# Patient Record
Sex: MALE | Race: WHITE
[De-identification: names, ages, dates, MRNs, and addresses within clinical notes are randomized per-mention and may not be internally consistent; named-entity substitution may affect disease eponyms.]

---

## 2018-05-07 ENCOUNTER — HOSPITAL ENCOUNTER (EMERGENCY)
Dept: HOSPITAL 33 - ED | Age: 23
LOS: 1 days | Discharge: HOME | End: 2018-05-08
Payer: COMMERCIAL

## 2018-05-07 VITALS — HEART RATE: 64 BPM | SYSTOLIC BLOOD PRESSURE: 134 MMHG | DIASTOLIC BLOOD PRESSURE: 87 MMHG

## 2018-05-07 DIAGNOSIS — T15.12XA: Primary | ICD-10-CM

## 2018-05-07 DIAGNOSIS — S00.252A: ICD-10-CM
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
## 2018-05-07 DIAGNOSIS — H02.814: ICD-10-CM

## 2018-05-07 PROCEDURE — 99283 EMERGENCY DEPT VISIT LOW MDM: CPT

## 2018-05-07 PROCEDURE — 66999 UNLISTED PX ANT SEGMENT EYE: CPT

## 2018-05-07 PROCEDURE — 65205 REMOVE FOREIGN BODY FROM EYE: CPT

## 2018-05-07 NOTE — ERPHSYRPT
- History of Present Illness


Time Seen by Provider: 05/07/18 22:58


Source: patient


Exam Limitations: no limitations


Patient Subjective Stated Complaint: Pt arrives to Er with c/o left eye pain 

and swelling stating was plumbing around 1500 and believes either has a piece 

of marble or metal shaving in left eye. States vision is blurry.


Triage Nursing Assessment: eye is swollen with drainage.


Physician History: 





The patient is a 23-year-old male complaining that something got in his left I 

while later at work today.  He says it could be a piece of marble metal 

shaving.  He tried to irrigate it but it is still bothering him.  His past 

medical history is unremarkable.


Timing/Duration: today


Location: left eye


Severity: moderate


Apparent Injury: possibly


Associated Symptoms: redness, matting, foreign body sensation, blurred vision


Visual Assistive Devices: None


Allergies/Adverse Reactions: 








No Known Drug Allergies Allergy (Verified 05/07/18 22:51)


 





Hx Tetanus, Diphtheria Vaccination/Date Given: Yes


Hx Influenza Vaccination/Date Given: No


Hx Pneumococcal Vaccination/Date Given: No


Immunizations Up to Date: Yes





- Review of Systems


Constitutional: No Fever, No Chills


Eyes: Discharge, Tearing, Foreign Body Sensation


Ears, Nose, & Throat: No Symptoms


Respiratory: No Cough, No Dyspnea


Cardiac: No Chest Pain, No Edema, No Syncope


Abdominal/Gastrointestinal: No Abdominal Pain, No Nausea, No Vomiting, No 

Diarrhea


Genitourinary Symptoms: No Dysuria


Musculoskeletal: No Back Pain, No Neck Pain


Skin: No Rash


Neurological: No Dizziness, No Focal Weakness, No Sensory Changes


Psychological: No Symptoms


Endocrine: No Symptoms


Hematologic/Lymphatic: No Symptoms


Immunological/Allergic: No Symptoms


All Other Systems: Reviewed and Negative





- Past Medical History


Pertinent Past Medical History: Yes


Neurological History: No Pertinent History


ENT History: No Pertinent History


Cardiac History: No Pertinent History


Respiratory History: No Pertinent History


Endocrine Medical History: No Pertinent History


Musculoskeletal History: No Pertinent History


GI Medical History: No Pertinent History


 History: No Pertinent History


Psycho-Social History: No Pertinent History


Male Reproductive Disorders: No Pertinent History


Other Medical History: tubes in ears





- Past Surgical History


Past Surgical History: Yes


Neuro Surgical History: No Pertinent History


Cardiac: No Pertinent History


Respiratory: No Pertinent History


Gastrointestinal: No Pertinent History


Genitourinary: No Pertinent History


Musculoskeletal: No Pertinent History


Male Surgical History: No Pertinent History


Other Surgical History: tubes in ears





- Social History


Smoking Status: Current every day smoker


Exposure to second hand smoke: Yes


Drug Use: none


Patient Lives Alone: No





- Nursing Vital Signs


Nursing Vital Signs: 


 Initial Vital Signs











Temperature  99.6 F   05/07/18 22:46


 


Pulse Rate  80   05/07/18 22:46


 


Respiratory Rate  16   05/07/18 22:46


 


Blood Pressure  149/83   05/07/18 22:46


 


O2 Sat by Pulse Oximetry  99   05/07/18 22:46








 Pain Scale











Pain Intensity                 2

















- Physical Exam


General Appearance: no apparent distress


Vision Acuity Degree Evaluation Phase: Uncorrected


Vision Acuity Right Eye: 20/30


Vision Acuity Left Eye: 20/70


Eye Exam: right eye: normal inspection, left eye: conjunctival inflammation, 

corneal abrasion (under fluorocein tiny punctate lesion at superior aspect), 

exudate, foreign body (tiny white FB found under upper eye lid and removed.)


Ears, Nose, Throat Exam: normal ENT inspection


Neck Exam: normal inspection


Respiratory Exam: normal breath sounds


Cardiovascular Exam: regular rate/rhythm


Gastrointestinal Exam: soft


Extremity Exam: normal inspection


Neurologic: alert


Skin Exam: normal color


**SpO2 Interpretation**: normal


SpO2: 99


Oxygen Delivery: Room Air


Ordered Tests: 


Medication Summary














Discontinued Medications














Generic Name Dose Route Start Last Admin





  Trade Name Freq  PRN Reason Stop Dose Admin


 


Eye Irrigation Solution  15 ml  05/07/18 22:58  05/07/18 23:01





  Eye-Stream Solution***  OP  05/07/18 22:59  15 ml





  STAT ONE   Administration


 


Eye Irrigation Solution  Confirm  05/07/18 22:59  





  Eye-Stream Solution***  Administered  05/07/18 23:00  





  Dose   





  30 ml   





  .ROUTE   





  .STK-MED ONE   


 


Fluorescein Sodium  1 mg  05/07/18 22:58  05/07/18 23:01





  Fluor-I-Strip/Ful-John***  OP  05/07/18 22:59  1 mg





  STAT ONE   Administration


 


Fluorescein Sodium  Confirm  05/07/18 22:58  





  Fluor-I-Strip/Ful-John***  Administered  05/07/18 22:59  





  Dose   





  1 mg   





  OP   





  .STK-MED ONE   


 


Sodium Chloride  Confirm  05/07/18 23:28  





  Sodium Chloride 0.9% 500 Ml  Administered  05/07/18 23:29  





  Dose   





  500 mls @ ud   





  IV   





  .STK-MED ONE   


 


Tetracaine HCl  4 ml  05/07/18 22:58  05/07/18 23:01





  Tetracaine 0.5% Steri-Unit Sol  OP  05/07/18 22:59  4 ml





  STAT STA   Administration


 


Tetracaine HCl  Confirm  05/07/18 22:58  





  Tetracaine 0.5% Steri-Unit Sol  Administered  05/07/18 22:59  





  Dose   





  4 ml   





  OP   





  .STK-MED ONE   














- Progress


Progress: improved


Progress Note: 





05/08/18 00:12


left eye flushed with  ml with Alexis lens with good results.


Counseled pt/family regarding: diagnosis





- Departure


Time of Disposition: 00:12


Departure Disposition: Home


Clinical Impression: 


 Foreign body of left eye





Condition: Stable


Critical Care Time: No


Referrals: 


AMBER BAUER [Primary Care Provider] - 


Additional Instructions: 


You had a small foreign body flushed out of your left eye by using a Alexsi 

lens tonight.  Use ciprofloxacin ophthalmic drops.  Use 1-2 drops every hour 

while awake for 7 days.  If your condition worsens tomorrow, please see your 

local eye doctor for further evaluation.

## 2018-05-08 VITALS — OXYGEN SATURATION: 99 %

## 2018-05-08 PROCEDURE — 08CPXZZ EXTIRPATION OF MATTER FROM LEFT UPPER EYELID, EXTERNAL APPROACH: ICD-10-PCS
